# Patient Record
Sex: FEMALE | Race: WHITE | NOT HISPANIC OR LATINO | URBAN - METROPOLITAN AREA
[De-identification: names, ages, dates, MRNs, and addresses within clinical notes are randomized per-mention and may not be internally consistent; named-entity substitution may affect disease eponyms.]

---

## 2019-05-20 PROBLEM — Z00.00 ENCOUNTER FOR PREVENTIVE HEALTH EXAMINATION: Status: ACTIVE | Noted: 2019-05-20

## 2019-05-28 ENCOUNTER — OUTPATIENT (OUTPATIENT)
Dept: OUTPATIENT SERVICES | Facility: HOSPITAL | Age: 39
LOS: 1 days | Discharge: HOME | End: 2019-05-28

## 2019-05-28 ENCOUNTER — APPOINTMENT (OUTPATIENT)
Dept: GYNECOLOGIC ONCOLOGY | Facility: CLINIC | Age: 39
End: 2019-05-28
Payer: COMMERCIAL

## 2019-05-28 VITALS
BODY MASS INDEX: 28.89 KG/M2 | HEART RATE: 68 BPM | WEIGHT: 157 LBS | DIASTOLIC BLOOD PRESSURE: 61 MMHG | TEMPERATURE: 97.9 F | HEIGHT: 62 IN | RESPIRATION RATE: 14 BRPM | SYSTOLIC BLOOD PRESSURE: 112 MMHG

## 2019-05-28 DIAGNOSIS — E28.8 OTHER OVARIAN DYSFUNCTION: ICD-10-CM

## 2019-05-28 PROCEDURE — 99204 OFFICE O/P NEW MOD 45 MIN: CPT

## 2019-05-28 NOTE — ASSESSMENT
[FreeTextEntry1] : 39yo with premature ovarian failure, h/o of endometrial polyp without pathology report due to logistic error at [facility]\par -It is difficult to assess for malignant potential without original specimen; however, normal results from a dilation and curettage are encouraging.  There is a theoretical risk of devloping malignancy with HRT or causing recurrence in a known case of endometrial cancer.  A recent sujata review from 5/2018 concluded there is insufficient evidence to inform against or for HRT in women after treatment of endometrial cancer and that each case should be individualized.  Overall, the risk of hormone replacement therapy on malignancy is small compared to the benefits afforded by HRT.  I discussed this with the patient, and given that the overall risk of malignancy is low based on patient demographics and normal D&C,  that my medical opinion is that HRT can be started.\par -In terms of further evaluation for malignancy, I recommended a pelvic MRI to evaluate for any radiographic evidence of malignancy, to which the patient agreed.  \par -Will order Pelvic MRI and call with results

## 2019-05-28 NOTE — HISTORY OF PRESENT ILLNESS
[FreeTextEntry1] : This is a 37 yo  LMP 3/25/18 with recent history of early menopause, desiring hormone therapy. Recent diagnostic laparoscopy at [facility] demonstrated "4 cm uterine mass" that was lost by pathology (report unavailable). Repeat diagnostic hysterscopy and D&C performed at [facility] demonstrated negative pathology. Discussion for starting hormone therapy or not while r/o malignancy.  Per patient, she has had extensive workup to evaluate etiology for premature ovarian failure which has been negative.\par \par Referred by: Dr. De Guzman\par \par Pathology: Benign endometrial curettings\par \par \par She has no complaints of pelvic pain; she has regular menstruation and no AUB\par She has normal appetite and normal oral intake\par She has no stress urinary incontinence, no urge incontinence, no hematuria\par She has regular bowel movements and no blood per rectum\par

## 2019-06-07 ENCOUNTER — OUTPATIENT (OUTPATIENT)
Dept: OUTPATIENT SERVICES | Facility: HOSPITAL | Age: 39
LOS: 1 days | Discharge: HOME | End: 2019-06-07
Payer: COMMERCIAL

## 2019-06-07 DIAGNOSIS — E28.39 OTHER PRIMARY OVARIAN FAILURE: ICD-10-CM

## 2019-06-07 DIAGNOSIS — E28.8 OTHER OVARIAN DYSFUNCTION: ICD-10-CM

## 2019-06-07 PROCEDURE — 74183 MRI ABD W/O CNTR FLWD CNTR: CPT | Mod: 26

## 2019-06-07 PROCEDURE — 72197 MRI PELVIS W/O & W/DYE: CPT | Mod: 26

## 2020-01-07 ENCOUNTER — APPOINTMENT (OUTPATIENT)
Dept: BREAST CENTER | Facility: CLINIC | Age: 40
End: 2020-01-07
Payer: COMMERCIAL

## 2020-01-07 VITALS
BODY MASS INDEX: 28.89 KG/M2 | SYSTOLIC BLOOD PRESSURE: 102 MMHG | DIASTOLIC BLOOD PRESSURE: 70 MMHG | TEMPERATURE: 98 F | HEIGHT: 62 IN | WEIGHT: 157 LBS

## 2020-01-07 DIAGNOSIS — Z80.52 FAMILY HISTORY OF MALIGNANT NEOPLASM OF BLADDER: ICD-10-CM

## 2020-01-07 DIAGNOSIS — Z80.1 FAMILY HISTORY OF MALIGNANT NEOPLASM OF TRACHEA, BRONCHUS AND LUNG: ICD-10-CM

## 2020-01-07 DIAGNOSIS — Z80.0 FAMILY HISTORY OF MALIGNANT NEOPLASM OF DIGESTIVE ORGANS: ICD-10-CM

## 2020-01-07 DIAGNOSIS — Z80.3 FAMILY HISTORY OF MALIGNANT NEOPLASM OF BREAST: ICD-10-CM

## 2020-01-07 PROCEDURE — 99203 OFFICE O/P NEW LOW 30 MIN: CPT

## 2020-01-15 ENCOUNTER — LABORATORY RESULT (OUTPATIENT)
Age: 40
End: 2020-01-15

## 2020-01-16 ENCOUNTER — FORM ENCOUNTER (OUTPATIENT)
Age: 40
End: 2020-01-16

## 2020-01-17 ENCOUNTER — OUTPATIENT (OUTPATIENT)
Dept: OUTPATIENT SERVICES | Facility: HOSPITAL | Age: 40
LOS: 1 days | Discharge: HOME | End: 2020-01-17

## 2020-01-17 DIAGNOSIS — Z02.9 ENCOUNTER FOR ADMINISTRATIVE EXAMINATIONS, UNSPECIFIED: ICD-10-CM

## 2020-01-22 ENCOUNTER — FORM ENCOUNTER (OUTPATIENT)
Age: 40
End: 2020-01-22

## 2020-01-23 ENCOUNTER — OUTPATIENT (OUTPATIENT)
Dept: OUTPATIENT SERVICES | Facility: HOSPITAL | Age: 40
LOS: 1 days | Discharge: HOME | End: 2020-01-23
Payer: COMMERCIAL

## 2020-01-23 VITALS
RESPIRATION RATE: 16 BRPM | WEIGHT: 156.97 LBS | HEART RATE: 68 BPM | HEIGHT: 62 IN | SYSTOLIC BLOOD PRESSURE: 110 MMHG | OXYGEN SATURATION: 99 % | TEMPERATURE: 97 F | DIASTOLIC BLOOD PRESSURE: 60 MMHG

## 2020-01-23 DIAGNOSIS — D24.1 BENIGN NEOPLASM OF RIGHT BREAST: ICD-10-CM

## 2020-01-23 DIAGNOSIS — Z01.818 ENCOUNTER FOR OTHER PREPROCEDURAL EXAMINATION: ICD-10-CM

## 2020-01-23 DIAGNOSIS — N63.10 UNSPECIFIED LUMP IN THE RIGHT BREAST, UNSPECIFIED QUADRANT: ICD-10-CM

## 2020-01-23 DIAGNOSIS — Z98.890 OTHER SPECIFIED POSTPROCEDURAL STATES: Chronic | ICD-10-CM

## 2020-01-23 LAB
ALBUMIN SERPL ELPH-MCNC: 4.6 G/DL — SIGNIFICANT CHANGE UP (ref 3.5–5.2)
ALP SERPL-CCNC: 73 U/L — SIGNIFICANT CHANGE UP (ref 30–115)
ALT FLD-CCNC: 16 U/L — SIGNIFICANT CHANGE UP (ref 0–41)
ANION GAP SERPL CALC-SCNC: 11 MMOL/L — SIGNIFICANT CHANGE UP (ref 7–14)
APTT BLD: 32.7 SEC — SIGNIFICANT CHANGE UP (ref 27–39.2)
AST SERPL-CCNC: 20 U/L — SIGNIFICANT CHANGE UP (ref 0–41)
BASOPHILS # BLD AUTO: 0.05 K/UL — SIGNIFICANT CHANGE UP (ref 0–0.2)
BASOPHILS NFR BLD AUTO: 0.8 % — SIGNIFICANT CHANGE UP (ref 0–1)
BILIRUB SERPL-MCNC: 0.6 MG/DL — SIGNIFICANT CHANGE UP (ref 0.2–1.2)
BUN SERPL-MCNC: 21 MG/DL — HIGH (ref 10–20)
CALCIUM SERPL-MCNC: 9.5 MG/DL — SIGNIFICANT CHANGE UP (ref 8.5–10.1)
CHLORIDE SERPL-SCNC: 103 MMOL/L — SIGNIFICANT CHANGE UP (ref 98–110)
CO2 SERPL-SCNC: 28 MMOL/L — SIGNIFICANT CHANGE UP (ref 17–32)
CREAT SERPL-MCNC: 0.8 MG/DL — SIGNIFICANT CHANGE UP (ref 0.7–1.5)
EOSINOPHIL # BLD AUTO: 0.1 K/UL — SIGNIFICANT CHANGE UP (ref 0–0.7)
EOSINOPHIL NFR BLD AUTO: 1.5 % — SIGNIFICANT CHANGE UP (ref 0–8)
GLUCOSE SERPL-MCNC: 94 MG/DL — SIGNIFICANT CHANGE UP (ref 70–99)
HCT VFR BLD CALC: 39.6 % — SIGNIFICANT CHANGE UP (ref 37–47)
HGB BLD-MCNC: 13.6 G/DL — SIGNIFICANT CHANGE UP (ref 12–16)
IMM GRANULOCYTES NFR BLD AUTO: 0.2 % — SIGNIFICANT CHANGE UP (ref 0.1–0.3)
INR BLD: 0.94 RATIO — SIGNIFICANT CHANGE UP (ref 0.65–1.3)
LYMPHOCYTES # BLD AUTO: 2.97 K/UL — SIGNIFICANT CHANGE UP (ref 1.2–3.4)
LYMPHOCYTES # BLD AUTO: 44.8 % — SIGNIFICANT CHANGE UP (ref 20.5–51.1)
MCHC RBC-ENTMCNC: 31.7 PG — HIGH (ref 27–31)
MCHC RBC-ENTMCNC: 34.3 G/DL — SIGNIFICANT CHANGE UP (ref 32–37)
MCV RBC AUTO: 92.3 FL — SIGNIFICANT CHANGE UP (ref 81–99)
MONOCYTES # BLD AUTO: 0.53 K/UL — SIGNIFICANT CHANGE UP (ref 0.1–0.6)
MONOCYTES NFR BLD AUTO: 8 % — SIGNIFICANT CHANGE UP (ref 1.7–9.3)
NEUTROPHILS # BLD AUTO: 2.97 K/UL — SIGNIFICANT CHANGE UP (ref 1.4–6.5)
NEUTROPHILS NFR BLD AUTO: 44.7 % — SIGNIFICANT CHANGE UP (ref 42.2–75.2)
NRBC # BLD: 0 /100 WBCS — SIGNIFICANT CHANGE UP (ref 0–0)
PLATELET # BLD AUTO: 328 K/UL — SIGNIFICANT CHANGE UP (ref 130–400)
POTASSIUM SERPL-MCNC: 4.8 MMOL/L — SIGNIFICANT CHANGE UP (ref 3.5–5)
POTASSIUM SERPL-SCNC: 4.8 MMOL/L — SIGNIFICANT CHANGE UP (ref 3.5–5)
PROT SERPL-MCNC: 6.6 G/DL — SIGNIFICANT CHANGE UP (ref 6–8)
PROTHROM AB SERPL-ACNC: 10.8 SEC — SIGNIFICANT CHANGE UP (ref 9.95–12.87)
RBC # BLD: 4.29 M/UL — SIGNIFICANT CHANGE UP (ref 4.2–5.4)
RBC # FLD: 11.8 % — SIGNIFICANT CHANGE UP (ref 11.5–14.5)
SODIUM SERPL-SCNC: 142 MMOL/L — SIGNIFICANT CHANGE UP (ref 135–146)
WBC # BLD: 6.63 K/UL — SIGNIFICANT CHANGE UP (ref 4.8–10.8)
WBC # FLD AUTO: 6.63 K/UL — SIGNIFICANT CHANGE UP (ref 4.8–10.8)

## 2020-01-23 PROCEDURE — G0279: CPT | Mod: 26,RT

## 2020-01-23 PROCEDURE — 93010 ELECTROCARDIOGRAM REPORT: CPT

## 2020-01-23 PROCEDURE — 77065 DX MAMMO INCL CAD UNI: CPT | Mod: 26,RT

## 2020-01-23 PROCEDURE — 76642 ULTRASOUND BREAST LIMITED: CPT | Mod: 26,RT

## 2020-01-23 NOTE — REVIEW OF SYSTEMS
[Fever] : no fever [Chills] : no chills [Breast Pain] : no breast pain [Breast Lump] : no breast lump [Breast Swelling] : no breast swelling [Breast Reddening] : no reddening of the breast [Breast Itching] : no breast itching [Breast Warmth] : no breast warmth [Decreasing In Size] : breast size not decreasing [Enlargement] : no breast enlargement [Dimpling Of Skin] : no dimpling of breast skin ['Orange Peel' Appearance] : no 'orange peel' appearance of breast skin [Nipple Discharge] : no nipple discharge [Nipple Inverted] : no inversion of the nipple

## 2020-01-23 NOTE — REASON FOR VISIT
[Consultation] : a consultation visit [FreeTextEntry1] : Right breast intraductal papilloma; GYN is Dr. Howe.

## 2020-01-23 NOTE — PAST MEDICAL HISTORY
[Postmenopausal] : The patient is postmenopausal [Menarche Age ____] : age at menarche was [unfilled] [Menopause Age____] : age at menopause was [unfilled] [Total Preg ___] : G[unfilled] [History of Hormone Replacement Treatment] : has no history of hormone replacement treatment [FreeTextEntry5] : uterine polypectomy; D&C. [FreeTextEntry6] : none [FreeTextEntry8] : none.  [FreeTextEntry7] : OCP for one year; discontinued in 2005.

## 2020-01-23 NOTE — ASSESSMENT
[FreeTextEntry1] : 39 year old female who presents today with screening detected right breast intraductal papilloma.  She has no prior complaints related to the breasts and no prior history of breast surgery or breast biopsy.  Her family history is significant for her mother with pancreatic cancer, maternal aunt with ovarian cancer, and her maternal first cousin with breast cancer at 29 and again at 34.  She has no genetic testing herself or in her family.  Her Laina Model risk assessment for breast cancer is 14.6% in her lifetime.  \par \par We discussed the use of estrogen therapy in light of her most recent diagnosis of premature menopause.  I advised her of the WHO guidelines of 2012.  There appears to be an increased risk of breast cancer after 5 years of HRT and/or over the age of 59.  Estrogen alone is the preferred HRT.  It is not without any risk, but the WHO guidelines should strongly be considered. \par \par On physical examination, there are no discreet masses in either breast or axilla.  There is no nipple discharge or inversion bilaterally.  There are no skin changes bilaterally.  Her recent biopsy pathology results were reviewed, as well as the 10% risk of finding breast malignancy upon complete removal of papilloma.  Genetic testing reviewed with the patient as well.  She agrees to right breast lumpectomy with needle localization.  She also agrees to genetic testing, which was performed in the office today.  All of the risks and benefits were discussed with the patient.  I spent a total of 30 minutes of face to face time with this patient, greater than 50% of which was spent in counseling and/or coordination of care.  All of her questions were appropriately answered. \par \par \par \par \par

## 2020-01-23 NOTE — HISTORY OF PRESENT ILLNESS
[FreeTextEntry1] : Patient with Right breast intraductal papilloma on ultrasound core biopsy 11/26/19; 7:00 retroareolar, 5 mm (cork).  \par No prior complaints related to the breasts.\par \par Her family history is significant for her maternal grandmother with bladder cancer at 55; her father with bladder cancer at 54; her mother with pancreatic cancer at 54; her maternal grandfather with lung cancer at 53; her maternal aunt with ovarian cancer; and her maternal first cousin with breast cancer at 29 and then again at 34.  No Breast or GYN genetic panels tested in the patient or her family.  \par \par Her Laina Model risk assessment is:\par 1.2 % in five years \par 14.6 % in her lifetime.\par

## 2020-01-23 NOTE — DATA REVIEWED
[FreeTextEntry1] : Study Date:   26 Oct 2019 12:30 \par Referring Phys.:  VERONIKA PICKENS Performing Location:   [facility]  \par EXAM:  MAMMO COMBO HD NORMAN SCREENING BILATERAL \par --------------------------------------------------------------------------------\par IMPRESSION:\par Indeterminant asymmetry in the right breast. Additional spot compression view, lateral view and ultrasound are recommended.\par An additional imaging exam of the right breast(s) is recommended. The patient will be sent a letter to return for additional imaging.\par BI-RADS 0: INCOMPLETE - NEED ADDITIONAL IMAGING EVALUATION\par MAMMO TOMOSYNTHESIS SCREENING BILATERAL\par Clinical Breast Exam: Patient does report clinical breast exam in the last year.\par Clinical Indication: Routine screening. Family history of maternal aunt with breast cancer.\par Comparison: None\par Tomosynthesis and 2D imaging of the breast(s) were performed. Current study was also evaluated with a computer aided detection (CAD) system.\par Breast density: There are scattered areas of fibroglandular density.\par No significant masses, calcifications, or other findings are seen in the left breast. In the retroareolar right breast there is an asymmetry only seen on the CC projection for which additional imaging is recommended.\par Electronic Signature: I personally reviewed the images and agree with this report. Final Report: Dictated by and Signed by Attending Ro Fontana MD 10/31/2019 1:08 PM\par  \par \par \par    \par \par Study Date:   13 Nov 2019 08:34 \par Referring Phys.:  VERONIKA PICKENS Performing Location:   [facility]  \par EXAM:  NORMAN HD CALLBACK FROM SCREENING RIGHT \par -------------------------------------------------------------------------------- \par OVERALL IMPRESSION:\par Single dilated duct in the right breast at 7:00 location with a associated mass for which ultrasound core biopsy is recommended.\par Biopsy of the right breast(s) is recommended. A letter will be sent to the patient to return for a biopsy.\par The findings and recommendations were discussed with the patient.\par BI-RADS 4: SUSPICIOUS\par MAMMO TOMOSYNTHESIS CALLBACK FROM SCREENING RIGHT, US BREAST LIMITED RIGHT\par Clinical Indication: Abnormal Findings on Mammogram.\par Compared to: 10/26/2019\par MAMMOGRAM:\par Tomosynthesis and 2D imaging of the breast(s) were performed. Current study was also evaluated with a computer aided detection (CAD) system.\par Breast Density: There are scattered areas of fibroglandular density.\par The previously seen asymmetry in the retroareolar right breast persists on additional spot compression view.\par US BREAST LIMITED RIGHT\par Color flow, Gray scale and real-time ultrasound of the right breast was performed and targeted to the area(s) of interest.\par In the right breast at 7:00 location retroareolar region corresponding to the mammographic abnormality there is a dilated duct with hypoechoic mass measuring 0.5 cm. Ultrasound core biopsy is recommended for further evaluation.\par Electronic Signature: I personally reviewed the images and agree with this report. Final Report: Dictated by and Signed by Attending Ro Fontana MD 11/13/2019 9:44 AM\par \par \par \par  \par Study Date:   26 Nov 2019 10:14 \par Referring Phys.:  LEIDY MURRELL Performing Location:   [facility]  \par EXAM:  US BREAST CORE BIOPSY \par -------------------------------------------------------------------------------- \par Pathology Report Received From [facility]:\par The pathology report of the right breast ultrasound biopsy dated 11/26/2019 indicated that the target at 7:00 is HIGH RISK\par DIAGNOSIS:\par 1. BREAST, RIGHT RETROAREOLAR, 7 O'CLOCK: CORE BIOPSY\par - INTRADUCTAL PAPILLOMA (SIZE AT LEAST 0.3 CM IN THIS SAMPLING)\par - DILATED BENIGN DUCTS\par - FIBROCYSTIC CHANGES\par The pathology results are concordant with the imaging findings.\par The patient has been notified of these results by telephone. She has been advised to contact your office and to arrange for SURGICAL CONSULTATION to discuss management options.\par Surgical consultation of the right breast(s) is recommended. _\par Electronic Signature: I personally reviewed the images and agree with this report. Final Report: Dictated by and Signed by Attending Ro Fontana MD 12/2/2019 3:50 PM\par *******END OF ADDENDUM******\par \par IMPRESSION:\par Ultrasound guided needle biopsy of the right breast. Pathology pending.\par A final report will be issued when Pathology results are available. _\par US BREAST CORE BIOPSY RIGHT, MAMMO TOMOSYNTHESIS DIGITAL POST PROCEDURE RIGHT\par HISTORY: Ultrasound of the right breast dated 11/13/2019 revealed a mass at 7:00 o'clock which was recommended for biopsy.\par Benefits, risks and alternatives to the procedure were explained to the patient and informed written consent was obtained.\par The patient denied taking aspirin or anticoagulants and stated no allergies to topical antiseptic solutions or local anesthetic.\par Utilizing universal protocol, site and patient verification was performed prior to starting the procedure.\par PROCEDURE: After sterile skin preparation, local anesthesia was achieved with 1% lidocaine. Under ultrasound guidance, a 12G vacuum assisted needle biopsy device was used to obtain multiple core specimens.\par After the procedure, a cork marking clip was deployed in the area of sampling.\par The patient tolerated the procedure well without immediate complications.\par POST PROCEDURE MAMMOGRAM FOR MARKER PLACEMENT\par A post-procedure mammogram was performed and demonstrates a clip in the appropriate location.\par Electronic Signature: I personally reviewed the images and agree with this report. Final Report: Dictated by and Signed by Attending Ro Fontana MD 11/26/2019 12:07 PM

## 2020-01-23 NOTE — H&P PST ADULT - HISTORY OF PRESENT ILLNESS
RIGHT BREAST LUMPECTOMY WITH NEEDLE LOCALIZATION    PATIENT CURRENTLY DENIES CHEST PAIN  SHORTNESS OF BREATH  PALPITATIONS,  DYSURIA, OR UPPER RESPIRATORY INFECTION IN PAST 2 WEEKS  EXERCISE  TOLERANCE  1-2 FLIGHT OF STAIRS  WITHOUT SHORTNESS OF BREATH

## 2020-01-23 NOTE — PHYSICAL EXAM
[Normocephalic] : normocephalic [Atraumatic] : atraumatic [Supple] : supple [No Supraclavicular Adenopathy] : no supraclavicular adenopathy [No Cervical Adenopathy] : no cervical adenopathy [No Thyromegaly] : no thyromegaly [Examined in the supine and seated position] : examined in the supine and seated position [Symmetrical] : symmetrical [No dominant masses] : no dominant masses in right breast  [No dominant masses] : no dominant masses left breast [No Nipple Retraction] : no left nipple retraction [Breast Mass Left Breast ___cm] : no masses [Breast Mass Right Breast ___cm] : no masses [No Nipple Discharge] : no left nipple discharge [No Axillary Lymphadenopathy] : no left axillary lymphadenopathy [No Edema] : no edema [No Swelling] : no swelling [Full ROM] : full range of motion [No Rashes] : no rashes [No Ulceration] : no ulceration [Breast Nipple Inversion] : nipples not inverted [Breast Nipple Retraction] : nipples not retracted [Breast Nipple Flattening] : nipples not flattened [Breast Nipple Fissures] : nipples not fissured [Breast Abnormal Lactation (Galactorrhea)] : no galactorrhea [Breast Abnormal Secretion Bloody Fluid] : no bloody discharge [Breast Abnormal Secretion Serous Fluid] : no serous discharge [Breast Abnormal Secretion Opalescent Fluid] : no milky discharge

## 2020-01-23 NOTE — CONSULT LETTER
[Dear  ___] : Dear  [unfilled], [Consult Letter:] : I had the pleasure of evaluating your patient, [unfilled]. [Please see my note below.] : Please see my note below. [Consult Closing:] : Thank you very much for allowing me to participate in the care of this patient.  If you have any questions, please do not hesitate to contact me. [Sincerely,] : Sincerely, [FreeTextEntry2] : Wil Howe M.D.\par [address]\[place] [address]  [FreeTextEntry3] : Swathi Omalley M.D., F.A.C.S.

## 2020-01-28 PROBLEM — E28.319 ASYMPTOMATIC PREMATURE MENOPAUSE: Chronic | Status: ACTIVE | Noted: 2020-01-23

## 2020-01-28 PROBLEM — M48.02 SPINAL STENOSIS, CERVICAL REGION: Chronic | Status: ACTIVE | Noted: 2020-01-23

## 2020-02-02 ENCOUNTER — FORM ENCOUNTER (OUTPATIENT)
Age: 40
End: 2020-02-02

## 2020-02-03 ENCOUNTER — APPOINTMENT (OUTPATIENT)
Dept: BREAST CENTER | Facility: AMBULATORY SURGERY CENTER | Age: 40
End: 2020-02-03
Payer: COMMERCIAL

## 2020-02-03 ENCOUNTER — RESULT REVIEW (OUTPATIENT)
Age: 40
End: 2020-02-03

## 2020-02-03 ENCOUNTER — OUTPATIENT (OUTPATIENT)
Dept: OUTPATIENT SERVICES | Facility: HOSPITAL | Age: 40
LOS: 1 days | Discharge: HOME | End: 2020-02-03
Payer: COMMERCIAL

## 2020-02-03 VITALS
DIASTOLIC BLOOD PRESSURE: 55 MMHG | OXYGEN SATURATION: 100 % | RESPIRATION RATE: 18 BRPM | HEART RATE: 73 BPM | SYSTOLIC BLOOD PRESSURE: 100 MMHG

## 2020-02-03 VITALS
SYSTOLIC BLOOD PRESSURE: 113 MMHG | DIASTOLIC BLOOD PRESSURE: 57 MMHG | HEART RATE: 72 BPM | OXYGEN SATURATION: 100 % | RESPIRATION RATE: 18 BRPM | TEMPERATURE: 99 F | HEIGHT: 62 IN | WEIGHT: 156.97 LBS

## 2020-02-03 DIAGNOSIS — Z98.890 OTHER SPECIFIED POSTPROCEDURAL STATES: Chronic | ICD-10-CM

## 2020-02-03 PROCEDURE — 19301 PARTIAL MASTECTOMY: CPT | Mod: RT

## 2020-02-03 PROCEDURE — 19281 PERQ DEVICE BREAST 1ST IMAG: CPT | Mod: RT

## 2020-02-03 PROCEDURE — 88305 TISSUE EXAM BY PATHOLOGIST: CPT | Mod: 26

## 2020-02-03 RX ORDER — SODIUM CHLORIDE 9 MG/ML
1000 INJECTION, SOLUTION INTRAVENOUS
Refills: 0 | Status: DISCONTINUED | OUTPATIENT
Start: 2020-02-03 | End: 2020-03-04

## 2020-02-03 RX ORDER — ONDANSETRON 8 MG/1
4 TABLET, FILM COATED ORAL ONCE
Refills: 0 | Status: DISCONTINUED | OUTPATIENT
Start: 2020-02-03 | End: 2020-03-04

## 2020-02-03 RX ORDER — OXYCODONE AND ACETAMINOPHEN 5; 325 MG/1; MG/1
1 TABLET ORAL EVERY 4 HOURS
Refills: 0 | Status: DISCONTINUED | OUTPATIENT
Start: 2020-02-03 | End: 2020-02-03

## 2020-02-03 RX ORDER — HYDROMORPHONE HYDROCHLORIDE 2 MG/ML
0.5 INJECTION INTRAMUSCULAR; INTRAVENOUS; SUBCUTANEOUS
Refills: 0 | Status: DISCONTINUED | OUTPATIENT
Start: 2020-02-03 | End: 2020-02-03

## 2020-02-03 RX ORDER — HYDROMORPHONE HYDROCHLORIDE 2 MG/ML
1 INJECTION INTRAMUSCULAR; INTRAVENOUS; SUBCUTANEOUS
Refills: 0 | Status: DISCONTINUED | OUTPATIENT
Start: 2020-02-03 | End: 2020-02-03

## 2020-02-03 RX ORDER — OXYCODONE AND ACETAMINOPHEN 5; 325 MG/1; MG/1
5-325 TABLET ORAL
Qty: 10 | Refills: 0 | Status: COMPLETED | COMMUNITY
Start: 2020-02-03 | End: 2020-02-08

## 2020-02-03 RX ADMIN — SODIUM CHLORIDE 100 MILLILITER(S): 9 INJECTION, SOLUTION INTRAVENOUS at 15:26

## 2020-02-03 NOTE — PRE-ANESTHESIA EVALUATION ADULT - NSANTHADDINFOFT_GEN_ALL_CORE
MAc vs general. discussed with the patient all the risks, benefits, alternatives, complications. all questions answered. willing to proceed

## 2020-02-03 NOTE — ASU DISCHARGE PLAN (ADULT/PEDIATRIC) - CARE PROVIDER_API CALL
Swathi Omalley)  Surgery  [address] [place] [address], [address] [place] [address]  Phone: [phone]  Fax: [phone]  Established Patient  Scheduled Appointment: 02/13/2020 03:30 PM

## 2020-02-03 NOTE — ASU DISCHARGE PLAN (ADULT/PEDIATRIC) - PROVIDER TOKENS
PROVIDER:[TOKEN:[[id number]],SCHEDULEDAPPT:[02/13/2020],SCHEDULEDAPPTTIME:[03:30 PM],ESTABLISHEDPATIENT:[T]]

## 2020-02-07 LAB — SURGICAL PATHOLOGY STUDY: SIGNIFICANT CHANGE UP

## 2020-02-13 ENCOUNTER — APPOINTMENT (OUTPATIENT)
Dept: BREAST CENTER | Facility: CLINIC | Age: 40
End: 2020-02-13
Payer: COMMERCIAL

## 2020-02-13 VITALS
HEIGHT: 62 IN | BODY MASS INDEX: 28.89 KG/M2 | DIASTOLIC BLOOD PRESSURE: 68 MMHG | TEMPERATURE: 98 F | WEIGHT: 157 LBS | SYSTOLIC BLOOD PRESSURE: 112 MMHG

## 2020-02-13 PROCEDURE — 99024 POSTOP FOLLOW-UP VISIT: CPT

## 2020-02-21 DIAGNOSIS — M48.02 SPINAL STENOSIS, CERVICAL REGION: ICD-10-CM

## 2020-02-21 DIAGNOSIS — N63.10 UNSPECIFIED LUMP IN THE RIGHT BREAST, UNSPECIFIED QUADRANT: ICD-10-CM

## 2020-02-21 DIAGNOSIS — Z91.040 LATEX ALLERGY STATUS: ICD-10-CM

## 2020-02-21 DIAGNOSIS — D24.1 BENIGN NEOPLASM OF RIGHT BREAST: ICD-10-CM

## 2020-02-21 DIAGNOSIS — E28.319 ASYMPTOMATIC PREMATURE MENOPAUSE: ICD-10-CM

## 2020-02-21 DIAGNOSIS — N60.21 FIBROADENOSIS OF RIGHT BREAST: ICD-10-CM

## 2020-03-05 NOTE — REVIEW OF SYSTEMS
[Breast Pain] : breast pain [Fever] : no fever [Chills] : no chills [de-identified] : 2/10 pain scale

## 2020-03-05 NOTE — DATA REVIEWED
[FreeTextEntry1] : Surgical Final Report\par Final Diagnosis\par Breast, right retroareolar mass, needle localized lumpectomy:\par -  Sclerosing intraductal papilloma located adjacent to a healing\par prior biopsy site.\par -  Adjacent benign retroareolar breast tissue with proliferative\par type fibrocystic changes.\par Verified by: Warren Baker M.D.\par (Electronic Signature)\par Reported on: 02/07/20 12:06 EST, [address] [place] [address],\[facility] [place] [address]\par Phone: [phone]   Fax: [phone]\par

## 2020-03-05 NOTE — HISTORY OF PRESENT ILLNESS
[FreeTextEntry1] : Patient with Right breast intraductal papilloma on ultrasound core biopsy 11/26/19; 7:00 retroareolar, 5 mm (cork).  \par No prior complaints related to the breasts.\par \par Her family history is significant for her maternal grandmother with bladder cancer at 55; her father with bladder cancer at 54; her mother with pancreatic cancer at 54; her maternal grandfather with lung cancer at 53; her maternal aunt with ovarian cancer; and her maternal first cousin with breast cancer at 29 and then again at 34.  No Breast or GYN genetic panels tested in the patient or her family.  \par \par Her Laina Model risk assessment is:\par 1.2 % in five years \par 14.6 % in her lifetime.\par \par Status post Right breast NLOC 2/3/20 demonstrating sclerosing intraductal papilloma located adjacent to a healing prior biopsy site.

## 2020-03-05 NOTE — PAST MEDICAL HISTORY
[History of Hormone Replacement Treatment] : has no history of hormone replacement treatment [FreeTextEntry5] : uterine polypectomy; D&C. [FreeTextEntry6] : none [FreeTextEntry7] : OCP for one year; discontinued in 2005. [FreeTextEntry8] : none.

## 2020-03-05 NOTE — ASSESSMENT
[FreeTextEntry1] : 39 year old female who presents for her post operative visit.  She has a history of right breast intraductal papilloma on ultrasound core biopsy 11/26/19 of 7:00 retroareolar, measuring 5 mm, status post lumpectomy 2/3/20 demonstrating the same.  She has no prior complaints related to the breasts and no prior history of breast surgery or breast biopsy.  Her family history is significant for her maternal grandmother and father with bladder cancer, her mother with pancreatic cancer, her maternal grandfather with lung cancer, her maternal aunt with ovarian cancer, and her maternal first cousin with breast cancer.  Her Laina Model risk assessment for breast cancer is 14.6% in her lifetime.\par \par On physical exam, her incision is healing well and the patient is without complaints. Her suture was removed today.  Her pathology results were reviewed.  For now, she will need bilateral diagnostic mammogram with right breast ultrasound for September 2020, with subsequent follow up clinical breast examination.  All of her questions were appropriately answered.

## 2020-12-02 ENCOUNTER — APPOINTMENT (OUTPATIENT)
Dept: BREAST CENTER | Facility: CLINIC | Age: 40
End: 2020-12-02

## 2020-12-07 ENCOUNTER — RESULT REVIEW (OUTPATIENT)
Age: 40
End: 2020-12-07

## 2020-12-07 ENCOUNTER — OUTPATIENT (OUTPATIENT)
Dept: OUTPATIENT SERVICES | Facility: HOSPITAL | Age: 40
LOS: 1 days | Discharge: HOME | End: 2020-12-07
Payer: COMMERCIAL

## 2020-12-07 DIAGNOSIS — R92.8 OTHER ABNORMAL AND INCONCLUSIVE FINDINGS ON DIAGNOSTIC IMAGING OF BREAST: ICD-10-CM

## 2020-12-07 DIAGNOSIS — Z98.890 OTHER SPECIFIED POSTPROCEDURAL STATES: Chronic | ICD-10-CM

## 2020-12-07 PROCEDURE — 76641 ULTRASOUND BREAST COMPLETE: CPT | Mod: 26,50

## 2020-12-07 PROCEDURE — G0279: CPT | Mod: 26

## 2020-12-07 PROCEDURE — 77066 DX MAMMO INCL CAD BI: CPT | Mod: 26

## 2021-03-16 ENCOUNTER — APPOINTMENT (OUTPATIENT)
Dept: BREAST CENTER | Facility: CLINIC | Age: 41
End: 2021-03-16
Payer: COMMERCIAL

## 2021-03-16 VITALS
HEIGHT: 62 IN | SYSTOLIC BLOOD PRESSURE: 126 MMHG | WEIGHT: 157 LBS | TEMPERATURE: 96.6 F | DIASTOLIC BLOOD PRESSURE: 80 MMHG | BODY MASS INDEX: 28.89 KG/M2

## 2021-03-16 PROCEDURE — 99072 ADDL SUPL MATRL&STAF TM PHE: CPT

## 2021-03-16 PROCEDURE — 99212 OFFICE O/P EST SF 10 MIN: CPT

## 2021-03-16 NOTE — HISTORY OF PRESENT ILLNESS
[FreeTextEntry1] : Patient with Right breast intraductal papilloma on ultrasound core biopsy 11/26/19; 7:00 retroareolar, 5 mm (cork).  \par No prior complaints related to the breasts.\par \par Her family history is significant for her maternal grandmother with bladder cancer at 55; her father with bladder cancer at 54; her mother with pancreatic cancer at 54; her maternal grandfather with lung cancer at 53; her maternal aunt with ovarian cancer; and her maternal first cousin with breast cancer at 29 and then again at 34.  No Breast or GYN genetic panels tested in the patient or her family.  \par \par Her Laina Model risk assessment is:\par 1.2 % in five years \par 14.6 % in her lifetime.\par \par s/p Right breast NLOC - 2/3/20 =  sclerosing intraductal papilloma located adjacent to a healing prior biopsy site.  \par \par GEOVANNY AMAYA is a 40 year old female patient who presents today in follow up for right breast intraductal papilloms.\par Since her last visit, she has no new breast related complaints. \par Imaging was done on 12/07/2020, which revealed no mammographic or sonographic evidence of malignancy. Stable right breast masses. Postsurgical change right breast.\par \par She presents today for evaluation and imaging review.

## 2021-03-16 NOTE — DATA REVIEWED
[FreeTextEntry1] : B/L Diagnostic Mammo & Sono - 12/07/2020:\par Breast composition: The breasts are heterogeneously dense, which may obscure small masses.\par \par Findings:\par \par Mammogram:\par Postsurgical architectural distortion is noted in the right breast.\par No suspicious mass, microcalcifications or areas of architectural distortion seen in either breast. Stable right breast masses noted.\par \par Ultrasound:\par \par Bilateral whole breast ultrasound was performed.\par In the upper outer quadrant of the right breast is an oval circumscribed anechoic mass measuring 0.5 cm favored to be simple cyst.\par There is no solid or cystic mass noted in either breast. No right or left axillary lymphadenopathy.\par \par Impression: No mammographic or sonographic evidence of malignancy. Stable right breast masses. Postsurgical change right breast.\par \par Recommendation: Unless otherwise indicated by clinical findings, annual screening mammography recommended.\par \par BI-RADS Category 2: Benign\par

## 2021-03-16 NOTE — PAST MEDICAL HISTORY
[Postmenopausal] : The patient is postmenopausal [Menarche Age ____] : age at menarche was [unfilled] [Menopause Age____] : age at menopause was [unfilled] [Total Preg ___] : G[unfilled] [History of Hormone Replacement Treatment] : has no history of hormone replacement treatment [FreeTextEntry5] : uterine polypectomy; D&C. [FreeTextEntry6] : none [FreeTextEntry7] : OCP for one year; discontinued in 2005. [FreeTextEntry8] : none.

## 2021-03-16 NOTE — PHYSICAL EXAM
[Normocephalic] : normocephalic [Atraumatic] : atraumatic [No Supraclavicular Adenopathy] : no supraclavicular adenopathy [No Cervical Adenopathy] : no cervical adenopathy [Examined in the supine and seated position] : examined in the supine and seated position [No dominant masses] : no dominant masses in right breast  [No dominant masses] : no dominant masses left breast [No Nipple Discharge] : no left nipple discharge [No Axillary Lymphadenopathy] : no left axillary lymphadenopathy [No Rashes] : no rashes [No Ulceration] : no ulceration [Breast Nipple Inversion] : nipples not inverted [Breast Nipple Retraction] : nipples not retracted [de-identified] : well healed surgical scar.

## 2021-12-10 ENCOUNTER — RESULT REVIEW (OUTPATIENT)
Age: 41
End: 2021-12-10

## 2021-12-10 ENCOUNTER — OUTPATIENT (OUTPATIENT)
Dept: OUTPATIENT SERVICES | Facility: HOSPITAL | Age: 41
LOS: 1 days | Discharge: HOME | End: 2021-12-10
Payer: COMMERCIAL

## 2021-12-10 DIAGNOSIS — Z98.890 OTHER SPECIFIED POSTPROCEDURAL STATES: Chronic | ICD-10-CM

## 2021-12-10 DIAGNOSIS — Z12.31 ENCOUNTER FOR SCREENING MAMMOGRAM FOR MALIGNANT NEOPLASM OF BREAST: ICD-10-CM

## 2021-12-10 DIAGNOSIS — R92.2 INCONCLUSIVE MAMMOGRAM: ICD-10-CM

## 2021-12-10 PROCEDURE — 77063 BREAST TOMOSYNTHESIS BI: CPT | Mod: 26

## 2021-12-10 PROCEDURE — 76641 ULTRASOUND BREAST COMPLETE: CPT | Mod: 26,50

## 2021-12-10 PROCEDURE — 77067 SCR MAMMO BI INCL CAD: CPT | Mod: 26

## 2021-12-13 ENCOUNTER — NON-APPOINTMENT (OUTPATIENT)
Age: 41
End: 2021-12-13

## 2021-12-30 ENCOUNTER — RESULT REVIEW (OUTPATIENT)
Age: 41
End: 2021-12-30

## 2021-12-30 ENCOUNTER — OUTPATIENT (OUTPATIENT)
Dept: OUTPATIENT SERVICES | Facility: HOSPITAL | Age: 41
LOS: 1 days | Discharge: HOME | End: 2021-12-30
Payer: COMMERCIAL

## 2021-12-30 DIAGNOSIS — R92.8 OTHER ABNORMAL AND INCONCLUSIVE FINDINGS ON DIAGNOSTIC IMAGING OF BREAST: ICD-10-CM

## 2021-12-30 DIAGNOSIS — Z98.890 OTHER SPECIFIED POSTPROCEDURAL STATES: Chronic | ICD-10-CM

## 2021-12-30 PROCEDURE — G0279: CPT | Mod: 26

## 2021-12-30 PROCEDURE — 76642 ULTRASOUND BREAST LIMITED: CPT | Mod: 26,LT

## 2021-12-30 PROCEDURE — 77065 DX MAMMO INCL CAD UNI: CPT | Mod: 26,LT

## 2022-01-03 ENCOUNTER — NON-APPOINTMENT (OUTPATIENT)
Age: 42
End: 2022-01-03

## 2022-02-18 ENCOUNTER — APPOINTMENT (OUTPATIENT)
Dept: BREAST CENTER | Facility: CLINIC | Age: 42
End: 2022-02-18
Payer: COMMERCIAL

## 2022-02-18 VITALS
BODY MASS INDEX: 29.44 KG/M2 | HEIGHT: 62 IN | WEIGHT: 160 LBS | SYSTOLIC BLOOD PRESSURE: 114 MMHG | DIASTOLIC BLOOD PRESSURE: 70 MMHG | HEART RATE: 68 BPM

## 2022-02-18 DIAGNOSIS — R92.8 OTHER ABNORMAL AND INCONCLUSIVE FINDINGS ON DIAGNOSTIC IMAGING OF BREAST: ICD-10-CM

## 2022-02-18 DIAGNOSIS — D24.1 BENIGN NEOPLASM OF RIGHT BREAST: ICD-10-CM

## 2022-02-18 PROCEDURE — 99212 OFFICE O/P EST SF 10 MIN: CPT

## 2022-02-18 NOTE — ASSESSMENT
[FreeTextEntry1] : GEOVANNY is a alida 41 year old patient who presented today in follow up for a history of right breast intraductal papilloma; BIRADS-3 imaging review.  \par She has been doing well with no new breast related complaints. \par Most recent imaging is as follows:\par B/L Screening Mammo - 12/10/2021:\par -The breasts are heterogeneously dense.\par -There are asymmetries seen in the left breast.\par BI-RADS Category 0:  Incomplete: Needs Additional Imaging Evaluation\par \par B/L Breast Sono - 12/10/2021:\par -Multiple simple appearing cysts are noted in both breasts.\par -No sonographic evidence of malignancy.\par Assessment: BI-RADS Category 2: Negative.\par \par Left Uni Dx Mammo & Sono - 12/30/2021:\par -Fluctuating nodular asymmetry in the left central breast is likely unchanged from 2019 indicative of a benign etiology. \par -Additional nodular asymmetry in the left inferior breast is not significantly changed from 2019, consistent with a benign etiology.\par Targeted left breast ultrasound was performed.\par -Scattered benign cysts are seen in the left breast. \par -Clustered microcysts in the left breast, 12:00 axis, 5 cm from the nipple likely corresponds to the nodular asymmetry in the left central breast. This measures 0.9 x 0.7 x 0.3 cm and is probably benign. \par -Additional fibrocystic tissue in the left breast, 6:00 axis, 3 cm from the nipple measures 1.1 x 0.8 x 0.4 cm. This likely corresponds to the stable benign asymmetry in the left inferior breast and is probably benign. \par BI-RADS category 3: Probably Benign, as detailed above. \par \par Physical exam was unrevealing today.\par \par We discussed BIRADS 3 lesions. These lesions have a 2% chance of harboring malignancy. There is always an option to obtain a tissue sample with a biopsy to confirm the diagnosis. The procedure was described in detail including the placement of a tissue marker clip. The risks of the procedure, including but not limited to bleeding and infection were also explained. She is not interested in biopsy at this time and agrees to continue with short-term interval imaging, which is traditionally performed at six-month intervals for approximately two years to establish stability.\par \par Imaging with a left unilateral diagnostic mammogram and sonogram will be due in July 2022, and that will be scheduled today. \par She will return for follow-up and clinical breast exam in July 2022 as well.\par \par I spent a total of 15 minutes of face to face time with this patient, greater than 50% of which was spent in counseling and/or coordination of care.\par All of her questions were appropriately answered.\par She knows to call with any concerns.

## 2022-02-18 NOTE — PHYSICAL EXAM
[Normocephalic] : normocephalic [Atraumatic] : atraumatic [No Supraclavicular Adenopathy] : no supraclavicular adenopathy [No dominant masses] : no dominant masses in right breast  [No dominant masses] : no dominant masses left breast [No Nipple Discharge] : no left nipple discharge [No Rashes] : no rashes [No Ulceration] : no ulceration [Breast Nipple Inversion] : nipples not inverted [Breast Nipple Retraction] : nipples not retracted [de-identified] : B/L nonspecific densities.  [de-identified] : well healed surgical scar. [de-identified] : No axillary lymphadenopathy appreciated. [de-identified] : No axillary lymphadenopathy appreciated.

## 2022-02-18 NOTE — REASON FOR VISIT
[Follow-Up: _____] : a [unfilled] follow-up visit [FreeTextEntry1] : h/o right breast papilloma; BIRADS-3 imaging review.

## 2022-02-18 NOTE — REVIEW OF SYSTEMS
[Negative] : Constitutional [Breast Pain] : no breast pain [Nipple Discharge] : no nipple discharge [Breast Lump] : no breast lump [Nipple Inverted] : no inversion of the nipple

## 2022-02-18 NOTE — HISTORY OF PRESENT ILLNESS
[FreeTextEntry1] : Patient with Right breast intraductal papilloma on ultrasound core biopsy 11/26/19; 7:00 retroareolar, 5 mm (cork).  \par No prior complaints related to the breasts.\par \par Her family history is significant for her maternal grandmother with bladder cancer at 55; her father with bladder cancer at 54; her mother with pancreatic cancer at 54; her maternal grandfather with lung cancer at 53; her maternal aunt with ovarian cancer; and her maternal first cousin with breast cancer at 29 and then again at 34.  No Breast or GYN genetic panels tested in the patient or her family.  \par \par Her Laina Model risk assessment is:\par 1.2 % in five years \par 14.6 % in her lifetime.\par \par s/p Right breast NLOC - 2/3/20 =  sclerosing intraductal papilloma located adjacent to a healing prior biopsy site.  \par \par GEOVANNY AMAYA is a 41 year old female patient who presents today in follow up for right breast intraductal papilloma; BIRADS-3 imaging review.\par Since her last visit, she has no new breast related complaints. \par \par Most recent imaging is as follows:\par B/L Screening Mammo - 12/10/2021:\par -The breasts are heterogeneously dense.\par -There are asymmetries seen in the left breast.\par BI-RADS Category 0:  Incomplete: Needs Additional Imaging Evaluation\par \par B/L Breast Sono - 12/10/2021:\par -Multiple simple appearing cysts are noted in both breasts.\par -No sonographic evidence of malignancy.\par Assessment: BI-RADS Category 2: Negative.\par \par Left Uni Dx Mammo & Sono - 12/30/2021:\par -Fluctuating nodular asymmetry in the left central breast is likely unchanged from 2019 indicative of a benign etiology. \par -Additional nodular asymmetry in the left inferior breast is not significantly changed from 2019, consistent with a benign etiology.\par Targeted left breast ultrasound was performed.\par -Scattered benign cysts are seen in the left breast. \par -Clustered microcysts in the left breast, 12:00 axis, 5 cm from the nipple likely corresponds to the nodular asymmetry in the left central breast. This measures 0.9 x 0.7 x 0.3 cm and is probably benign. \par -Additional fibrocystic tissue in the left breast, 6:00 axis, 3 cm from the nipple measures 1.1 x 0.8 x 0.4 cm. This likely corresponds to the stable benign asymmetry in the left inferior breast and is probably benign. \par BI-RADS category 3: Probably Benign\par \par She presents today for evaluation and imaging review.

## 2022-02-18 NOTE — DATA REVIEWED
[FreeTextEntry1] : B/L Screening Mammo - 12/10/2021:\par MAMMOGRAM FINDINGS:\par Mammography was performed including the following views: bilateral craniocaudal with tomosynthesis, bilateral mediolateral oblique with tomosynthesis.  The examination includes digital synthetic 2D and digital tomosynthesis 3D images. Additional imaging analysis was performed using CAD (computer-aided detection) software.\par \par The breasts are heterogeneously dense, which may obscure small masses.\par \par There are asymmetries seen in the left breast.\par \par No suspicious mass, grouping of calcifications, or other abnormality is identified in the right breast.\par \par IMPRESSION:\par Asymmetries in the left breast requires additional evaluation.\par \par RECOMMENDATION:\par Patient will be recalled for additional mammographic views and breast ultrasound.\par \par ASSESSMENT:\par BI-RADS Category 0:  Incomplete: Needs Additional Imaging Evaluation\par \par \par B/L Breast Sono - 12/10/2021:\par Breast composition: The breasts are heterogeneously dense, which may obscure small masses.\par \par Findings:\par \par Ultrasound:\par \par Bilateral whole breast ultrasound was performed.\par Multiple simple appearing cysts are noted in both breasts.\par No other solid or cystic mass noted in either breast. No right or left axillary lymphadenopathy.\par \par Impression: No sonographic evidence of malignancy.\par \par Recommendation: Patient will be recalled for additional mammographic views and, if indicated, breast ultrasound. As per screening mammogram report, patient will be recalled.\par \par Assessment: BI-RADS Category 2: Negative.\par \par \par Left Uni Dx Mammo & Sono - 12/30/2021:\par BREAST COMPOSITION: The breasts are heterogeneously dense, which may obscure small masses.\par \par VIEWS: 2D/tomosynthesis ML and spot views of the left breast. Computer-aided detection was utilized by the radiologists in the interpretation of this examination.\par \par \par FINDINGS:\par \par MAMMOGRAM:\par Fluctuating nodular asymmetry in the left central breast is likely unchanged from 2019 indicative of a benign etiology. Mammographic follow-up is recommended in 6 months to demonstrate stability.\par \par Additional nodular asymmetry in the left inferior breast is not significantly changed from 2019, consistent with a benign etiology.\par \par No suspicious masses, calcifications, or areas of architectural distortion are seen.\par \par ULTRASOUND:\par Targeted left breast ultrasound was performed.\par \par Scattered benign cysts are seen in the left breast. Clustered microcysts in the left breast, 12:00 axis, 5 cm from the nipple likely corresponds to the nodular asymmetry in the left central breast. This measures 0.9 x 0.7 x 0.3 cm and is probably benign. Continued follow-up is recommended in 6 months to demonstrate stability.\par \par Additional fibrocystic tissue in the left breast, 6:00 axis, 3 cm from the nipple measures 1.1 x 0.8 x 0.4 cm. This likely corresponds to the stable benign asymmetry in the left inferior breast and is probably benign. Continued follow-up is recommended to demonstrate stability.\par \par \par IMPRESSION:\par \par Probably benign left breast asymmetry/left breast masses as above. Mammographic/sonographic follow-up is recommended in 6 months to demonstrate stability.\par \par Recommendation: Follow-up unilateral diagnostic mammogram and ultrasound in 6 months.\par \par BI-RADS category 3: Probably Benign\par \par

## 2022-04-26 NOTE — PRE-ANESTHESIA EVALUATION ADULT - NSANTHINDVINFOSD_GEN_ALL_CORE
Head,  normocephalic,  atraumatic,  Face,  Face within normal limits,  Ears,  External ears within normal limits,  Nose/Nasopharynx,  External nose  normal appearance,  nares patent,  no nasal discharge,  Mouth and Throat,  Oral cavity appearance normal,  Breath odor normal,  Lips,  Appearance normal patient

## 2022-07-19 ENCOUNTER — APPOINTMENT (OUTPATIENT)
Dept: BREAST CENTER | Facility: CLINIC | Age: 42
End: 2022-07-19

## 2022-07-22 ENCOUNTER — RESULT REVIEW (OUTPATIENT)
Age: 42
End: 2022-07-22

## 2022-07-22 ENCOUNTER — OUTPATIENT (OUTPATIENT)
Dept: OUTPATIENT SERVICES | Facility: HOSPITAL | Age: 42
LOS: 1 days | Discharge: HOME | End: 2022-07-22

## 2022-07-22 DIAGNOSIS — R92.8 OTHER ABNORMAL AND INCONCLUSIVE FINDINGS ON DIAGNOSTIC IMAGING OF BREAST: ICD-10-CM

## 2022-07-22 DIAGNOSIS — Z98.890 OTHER SPECIFIED POSTPROCEDURAL STATES: Chronic | ICD-10-CM

## 2022-07-22 PROCEDURE — 76641 ULTRASOUND BREAST COMPLETE: CPT | Mod: 26,LT

## 2022-07-22 PROCEDURE — 77065 DX MAMMO INCL CAD UNI: CPT | Mod: 26,LT

## 2022-07-22 PROCEDURE — G0279: CPT | Mod: 26

## 2025-05-20 NOTE — ASU PREOP CHECKLIST - WEIGHT IN KG
Number provided is not active. Googled and located [employer] number and spoke to Kevin, who then directed my call to the clinical line. CA Left Voice Message to advise on referral for mutual patient and advised on sending it again with a full speech assessment for proper review. Provided call back number.    Attempted to call mom but no answer and could not leave voicemail.    [phone]